# Patient Record
Sex: FEMALE | Race: WHITE
[De-identification: names, ages, dates, MRNs, and addresses within clinical notes are randomized per-mention and may not be internally consistent; named-entity substitution may affect disease eponyms.]

---

## 2017-01-01 ENCOUNTER — HOSPITAL ENCOUNTER (EMERGENCY)
Age: 27
Discharge: HOME | End: 2017-01-01
Payer: MEDICAID

## 2017-01-01 DIAGNOSIS — Z87.891: ICD-10-CM

## 2017-01-01 DIAGNOSIS — O99.513: Primary | ICD-10-CM

## 2017-01-01 DIAGNOSIS — Z3A.36: ICD-10-CM

## 2017-01-01 DIAGNOSIS — J06.9: ICD-10-CM

## 2017-01-01 DIAGNOSIS — O09.293: ICD-10-CM

## 2017-01-01 PROCEDURE — 99283 EMERGENCY DEPT VISIT LOW MDM: CPT

## 2017-01-01 PROCEDURE — 94640 AIRWAY INHALATION TREATMENT: CPT

## 2017-01-01 PROCEDURE — 99282 EMERGENCY DEPT VISIT SF MDM: CPT

## 2017-08-20 ENCOUNTER — HOSPITAL ENCOUNTER (EMERGENCY)
Dept: HOSPITAL 76 - ED | Age: 27
Discharge: HOME | End: 2017-08-20
Payer: COMMERCIAL

## 2017-08-20 VITALS — SYSTOLIC BLOOD PRESSURE: 112 MMHG | DIASTOLIC BLOOD PRESSURE: 66 MMHG

## 2017-08-20 DIAGNOSIS — K14.0: Primary | ICD-10-CM

## 2017-08-20 DIAGNOSIS — Z87.891: ICD-10-CM

## 2017-08-20 PROCEDURE — 99283 EMERGENCY DEPT VISIT LOW MDM: CPT

## 2017-08-20 PROCEDURE — 99282 EMERGENCY DEPT VISIT SF MDM: CPT

## 2017-08-20 NOTE — ED PHYSICIAN DOCUMENTATION
PD HPI HEENT





- Stated complaint


Stated Complaint: SWOLLEN TONGUE





- Chief complaint


Chief Complaint: Heent





- History obtained from


History obtained from: Patient





- History of Present Illness


Timing - onset: How many days ago (few days of tongue swelling under area of 

long-time piercing. Has not had new ring/stud, same one for 3 months, without 

prior reaction to metals. No prior infections.)


Timing - duration: Days


Timing - details: Gradual onset, Still present


Location: Mouth (tongue).  No: Right ear, Left ear





Review of Systems


Constitutional: denies: Fever, Chills


Skin: denies: Rash, Lesions





PD PAST MEDICAL HISTORY





- Past Medical History


GYN: Miscarriage(s)





- Past Surgical History


Past Surgical History: Yes


/GYN: Dilation and currettage





- Present Medications


Home Medications: 


 Ambulatory Orders











 Medication  Instructions  Recorded  Confirmed


 


Cetirizine [ZyrTEC] 10 mg PO DAILY 08/20/17 08/20/17


 


Chlorhexidine Gluconate [Peridex] 5 ml MM BID #118 ml 08/20/17 


 


Clindamycin [Cleocin] 150 mg PO QID #24 capsule 08/20/17 














- Allergies


Allergies/Adverse Reactions: 


 Allergies











Allergy/AdvReac Type Severity Reaction Status Date / Time


 


Penicillins Allergy Mild Rash Verified 08/20/17 15:16


 


Children's Motrin Allergy  Hives Uncoded 08/20/17 15:16














- Social History


Does the pt smoke?: No


Smoking Status: Former smoker


Does the pt drink ETOH?: Yes


Does the pt have substance abuse?: No





- Immunizations


Immunizations are current?: Yes





- POLST


Patient has POLST: No





PD ED PE NORMAL





- Vitals


Vital signs reviewed: Yes





- General


General: Alert and oriented X 3, No acute distress, Well developed/nourished





- HEENT


HEENT: Other (tongue with piercing hole without discharge. Stud is out right 

now. area of firmness and tenderness of tongue in a cm size or so. No 

fluctuance. No redness per se.)





- Neck


Neck: Supple, no meningeal sign, No adenopathy





Results





- Vitals


Vitals: 


 Oxygen











O2 Source                      Room air

















PD MEDICAL DECISION MAKING





- ED course


Complexity details: considered differential (firmness without fluctuance in 

area and there is the hole from the piercing, so no need for I&D. ), d/w patient





Departure





- Departure


Disposition: 01 Home, Self Care


Clinical Impression: 


 Infected pierced tongue


Condition: Stable


Record reviewed to determine appropriate education?: Yes


Prescriptions: 


Clindamycin [Cleocin] 150 mg PO QID #24 capsule


Chlorhexidine Gluconate [Peridex] 5 ml MM BID #118 ml


Comments: 


 I assume infection is the most likely cause for the swelling of the tongue 

even though it does not seem purulent.  The swelling of there feels like 

inflammation.  I would do some antimicrobial mouth rinse 2-3 times a day and 

you could even use a Q-tip at the piercing site with it.  Also clindamycin oral 

antibiotic as directed.  See if the swelling goes down over the next few days.


Discharge Date/Time: 08/20/17 16:07

## 2017-08-28 ENCOUNTER — HOSPITAL ENCOUNTER (EMERGENCY)
Dept: HOSPITAL 76 - ED | Age: 27
Discharge: HOME | End: 2017-08-28
Payer: COMMERCIAL

## 2017-08-28 VITALS — SYSTOLIC BLOOD PRESSURE: 112 MMHG | DIASTOLIC BLOOD PRESSURE: 70 MMHG

## 2017-08-28 DIAGNOSIS — Z87.891: ICD-10-CM

## 2017-08-28 DIAGNOSIS — R10.13: Primary | ICD-10-CM

## 2017-08-28 DIAGNOSIS — R10.12: ICD-10-CM

## 2017-08-28 LAB
ALBUMIN/GLOB SERPL: 1.5 {RATIO} (ref 1–2.2)
ANION GAP SERPL CALCULATED.4IONS-SCNC: 9 MMOL/L (ref 6–13)
BASOPHILS NFR BLD AUTO: 0.1 10^3/UL (ref 0–0.1)
BASOPHILS NFR BLD AUTO: 1 %
BILIRUB BLD-MCNC: 1 MG/DL (ref 0.2–1)
BUN SERPL-MCNC: 14 MG/DL (ref 6–20)
CALCIUM UR-MCNC: 9.1 MG/DL (ref 8.5–10.3)
CHLORIDE SERPL-SCNC: 105 MMOL/L (ref 101–111)
CO2 SERPL-SCNC: 24 MMOL/L (ref 21–32)
CREAT SERPLBLD-SCNC: 0.5 MG/DL (ref 0.4–1)
CUL URINE ADD CHARGE: (no result)
EOSINOPHIL # BLD AUTO: 0.3 10^3/UL (ref 0–0.7)
EOSINOPHIL NFR BLD AUTO: 3 %
ERYTHROCYTE [DISTWIDTH] IN BLOOD BY AUTOMATED COUNT: 13.7 % (ref 12–15)
GFRSERPLBLD MDRD-ARVRAT: 146 ML/MIN/{1.73_M2} (ref 89–?)
GLOBULIN SER-MCNC: 3 G/DL (ref 2.1–4.2)
GLUCOSE SERPL-MCNC: 118 MG/DL (ref 70–100)
HCG UR QL: NEGATIVE
HCT VFR BLD AUTO: 38 % (ref 37–47)
HGB UR QL STRIP: 12.8 G/DL (ref 12–16)
LIPASE SERPL-CCNC: 39 U/L (ref 22–51)
LYMPHOCYTES # SPEC AUTO: 3.1 10^3/UL (ref 1.5–3.5)
LYMPHOCYTES NFR BLD AUTO: 27.1 %
MCH RBC QN AUTO: 28 PG (ref 27–31)
MCHC RBC AUTO-ENTMCNC: 33.7 G/DL (ref 32–36)
MCV RBC AUTO: 83.1 FL (ref 81–99)
MONOCYTES # BLD AUTO: 0.7 10^3/UL (ref 0–1)
MONOCYTES NFR BLD AUTO: 6.2 %
NEUTROPHILS # BLD AUTO: 7.2 10^3/UL (ref 1.5–6.6)
NEUTROPHILS # SNV AUTO: 11.5 X10^3/UL (ref 4.8–10.8)
NEUTROPHILS NFR BLD AUTO: 62.7 %
NRBC # BLD AUTO: 0.1 /100WBC
PDW BLD AUTO: 7.4 FL (ref 7.9–10.8)
PH UR STRIP.AUTO: 6 PH (ref 5–7.5)
POTASSIUM SERPL-SCNC: 3.5 MMOL/L (ref 3.5–5)
PROT SPEC-MCNC: 7.6 G/DL (ref 6.7–8.2)
RBC MAR: 4.57 10^6/UL (ref 4.2–5.4)
SODIUM SERPLBLD-SCNC: 138 MMOL/L (ref 135–145)
SP GR UR STRIP.AUTO: >=1.03 (ref 1–1.03)
SP GR UR STRIP.AUTO: >=1.03 (ref 1–1.03)
UA CHARGE (STRIP ONLY): (no result)
UA W/ MICROSCOPIC CHARGE: YES
UR CULTURE IF IND: (no result)
UROBILINOGEN UR STRIP.AUTO-MCNC: NEGATIVE MG/DL
WBC # BLD: 11.5 X10^3/UL
WBC # UR MANUAL: (no result) /HPF (ref 0–5)

## 2017-08-28 PROCEDURE — 36415 COLL VENOUS BLD VENIPUNCTURE: CPT

## 2017-08-28 PROCEDURE — 80053 COMPREHEN METABOLIC PANEL: CPT

## 2017-08-28 PROCEDURE — 81025 URINE PREGNANCY TEST: CPT

## 2017-08-28 PROCEDURE — 81001 URINALYSIS AUTO W/SCOPE: CPT

## 2017-08-28 PROCEDURE — 87086 URINE CULTURE/COLONY COUNT: CPT

## 2017-08-28 PROCEDURE — 81003 URINALYSIS AUTO W/O SCOPE: CPT

## 2017-08-28 PROCEDURE — 85025 COMPLETE CBC W/AUTO DIFF WBC: CPT

## 2017-08-28 PROCEDURE — 99283 EMERGENCY DEPT VISIT LOW MDM: CPT

## 2017-08-28 PROCEDURE — 74160 CT ABDOMEN W/CONTRAST: CPT

## 2017-08-28 PROCEDURE — 83690 ASSAY OF LIPASE: CPT
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
## 2017-08-28 NOTE — CT REPORT
EXAM:

CT ABDOMEN

 

EXAM DATE: 8/28/2017 07:53 PM.

 

CLINICAL HISTORY: Upper abdominal pain following trauma.

 

COMPARISON: Limited abdominal ultrasound 08/11/2016. No prior CT study..

 

TECHNIQUE: Routine helical CT imaging was performed through the abdomen.  IV contrast: 100 mL isovue-
300 Enteric contrast: No. Reconstruction: Coronal and sagittal.

 

In accordance with CT protocol optimization, one or more of the following dose reduction techniques w
ere utilized for this exam: automated exposure control, adjustment of mA and/or KV based on patient s
ize, or use of iterative reconstructive technique.

 

FINDINGS: 

Lung Bases: Unremarkable.

 

Liver: Stable 1.8 cm lesion right lobe of liver consistent with hemangioma. Several 3 mm hepatic cyst
s.

 

Gallbladder/Bile Ducts: Unremarkable.

 

Spleen: Normal.

 

Pancreas: Normal.

 

Adrenal Glands: Normal.

 

Kidneys: 1.5 cm staghorn calculus inferior left calyceal system. Several 5 mm and smaller stones in b
oth kidneys. No hydronephrosis nor renal mass lesions. 

 

Peritoneal Cavity/Bowel: Normal. No free fluid, free air or adenopathy. No masses or acute inflammato
ry process. The appendix is well visualized and normal.

 

Vasculature: No aneurysms or other significant abnormality.

 

Bones: Old minimal anterior wedging T11.

Trabecular and cortical patterns are intact.

 

Other: None.

 

IMPRESSION: 

1. Stable 1.8 cm hemangioma right lobe of the liver. 

2. Bilateral nephrolithiasis. 

3. No acute posttraumatic abnormality.

 

[facility]

Referring Provider Line: [phone]

 

SITE ID: [id number]

## 2017-08-28 NOTE — CT PRELIMINARY REPORT
Accession: [id number]

Exam: CT Abdomen W/

 

IMPRESSION: 

1. Stable 1.8 cm hemangioma right lobe of the liver. 

2. Bilateral nephrolithiasis. 

3. No acute posttraumatic abnormality.

 

[facility]

 

SITE ID: [id number]

## 2017-09-26 ENCOUNTER — HOSPITAL ENCOUNTER (EMERGENCY)
Dept: HOSPITAL 76 - ED | Age: 27
Discharge: HOME | End: 2017-09-26
Payer: MEDICAID

## 2017-09-26 VITALS — SYSTOLIC BLOOD PRESSURE: 111 MMHG | DIASTOLIC BLOOD PRESSURE: 60 MMHG

## 2017-09-26 DIAGNOSIS — K52.9: Primary | ICD-10-CM

## 2017-09-26 DIAGNOSIS — Z87.891: ICD-10-CM

## 2017-09-26 LAB
ALBUMIN/GLOB SERPL: 1.2 {RATIO} (ref 1–2.2)
ANION GAP SERPL CALCULATED.4IONS-SCNC: 7 MMOL/L (ref 6–13)
BASOPHILS NFR BLD AUTO: 0 10^3/UL (ref 0–0.1)
BASOPHILS NFR BLD AUTO: 0.3 %
BILIRUB BLD-MCNC: 2.2 MG/DL (ref 0.2–1)
BUN SERPL-MCNC: 9 MG/DL (ref 6–20)
CALCIUM UR-MCNC: 8.4 MG/DL (ref 8.5–10.3)
CHLORIDE SERPL-SCNC: 107 MMOL/L (ref 101–111)
CO2 SERPL-SCNC: 22 MMOL/L (ref 21–32)
CREAT SERPLBLD-SCNC: 0.4 MG/DL (ref 0.4–1)
EOSINOPHIL # BLD AUTO: 0.3 10^3/UL (ref 0–0.7)
EOSINOPHIL NFR BLD AUTO: 3.3 %
ERYTHROCYTE [DISTWIDTH] IN BLOOD BY AUTOMATED COUNT: 14.1 % (ref 12–15)
GFRSERPLBLD MDRD-ARVRAT: 189 ML/MIN/{1.73_M2} (ref 89–?)
GLOBULIN SER-MCNC: 3.2 G/DL (ref 2.1–4.2)
GLUCOSE SERPL-MCNC: 94 MG/DL (ref 70–100)
HCT VFR BLD AUTO: 40 % (ref 37–47)
HGB UR QL STRIP: 13.4 G/DL (ref 12–16)
LIPASE SERPL-CCNC: 32 U/L (ref 22–51)
LYMPHOCYTES # SPEC AUTO: 1.5 10^3/UL (ref 1.5–3.5)
LYMPHOCYTES NFR BLD AUTO: 17.2 %
MCH RBC QN AUTO: 28 PG (ref 27–31)
MCHC RBC AUTO-ENTMCNC: 33.5 G/DL (ref 32–36)
MCV RBC AUTO: 83.6 FL (ref 81–99)
MONOCYTES # BLD AUTO: 0.6 10^3/UL (ref 0–1)
MONOCYTES NFR BLD AUTO: 6.7 %
NEUTROPHILS # BLD AUTO: 6.2 10^3/UL (ref 1.5–6.6)
NEUTROPHILS # SNV AUTO: 8.6 X10^3/UL (ref 4.8–10.8)
NEUTROPHILS NFR BLD AUTO: 72.5 %
NRBC # BLD AUTO: 0 /100WBC
PDW BLD AUTO: 7.2 FL (ref 7.9–10.8)
POTASSIUM SERPL-SCNC: 3.6 MMOL/L (ref 3.5–5)
PROT SPEC-MCNC: 7 G/DL (ref 6.7–8.2)
RBC MAR: 4.78 10^6/UL (ref 4.2–5.4)
SODIUM SERPLBLD-SCNC: 136 MMOL/L (ref 135–145)
WBC # BLD: 8.6 X10^3/UL

## 2017-09-26 PROCEDURE — 80053 COMPREHEN METABOLIC PANEL: CPT

## 2017-09-26 PROCEDURE — 83690 ASSAY OF LIPASE: CPT

## 2017-09-26 PROCEDURE — 99283 EMERGENCY DEPT VISIT LOW MDM: CPT

## 2017-09-26 PROCEDURE — 96374 THER/PROPH/DIAG INJ IV PUSH: CPT

## 2017-09-26 PROCEDURE — 36415 COLL VENOUS BLD VENIPUNCTURE: CPT

## 2017-09-26 PROCEDURE — 85025 COMPLETE CBC W/AUTO DIFF WBC: CPT

## 2020-12-18 ENCOUNTER — HOSPITAL ENCOUNTER (OUTPATIENT)
Dept: HOSPITAL 76 - COV | Age: 30
Discharge: HOME | End: 2020-12-18
Attending: FAMILY MEDICINE
Payer: COMMERCIAL

## 2020-12-18 DIAGNOSIS — Z20.828: Primary | ICD-10-CM

## 2022-11-25 ENCOUNTER — HOSPITAL ENCOUNTER (EMERGENCY)
Dept: HOSPITAL 76 - ED | Age: 32
Discharge: HOME | End: 2022-11-25
Payer: COMMERCIAL

## 2022-11-25 VITALS — DIASTOLIC BLOOD PRESSURE: 89 MMHG | SYSTOLIC BLOOD PRESSURE: 133 MMHG

## 2022-11-25 DIAGNOSIS — Z87.891: ICD-10-CM

## 2022-11-25 DIAGNOSIS — J10.1: Primary | ICD-10-CM

## 2022-11-25 LAB
B PARAPERT DNA SPEC QL NAA+PROBE: NOT DETECTED
B PERT DNA SPEC QL NAA+PROBE: NOT DETECTED
C PNEUM DNA NPH QL NAA+NON-PROBE: NOT DETECTED
FLUAV H3 RNA ISLT QL NAA+PROBE: DETECTED
HAEM INFLU B DNA SPEC QL NAA+PROBE: NOT DETECTED
HCOV 229E RNA SPEC QL NAA+PROBE: NOT DETECTED
HCOV HKU1 RNA UPPER RESP QL NAA+PROBE: NOT DETECTED
HCOV NL63 RNA ASPIRATE QL NAA+PROBE: NOT DETECTED
HCOV OC43 RNA SPEC QL NAA+PROBE: NOT DETECTED
HMPV AG SPEC QL: NOT DETECTED
HPIV1 RNA NPH QL NAA+PROBE: NOT DETECTED
HPIV2 SPEC QL CULT: NOT DETECTED
HPIV3 AB TITR SER CF: NOT DETECTED {TITER}
HPIV4 RNA SPEC QL NAA+PROBE: NOT DETECTED
M PNEUMO DNA SPEC QL NAA+PROBE: NOT DETECTED
RSV RNA RESP QL NAA+PROBE: NOT DETECTED
RV+EV RNA SPEC QL NAA+PROBE: NOT DETECTED
SARS-COV-2 RNA PNL SPEC NAA+PROBE: NOT DETECTED

## 2022-11-25 PROCEDURE — 94640 AIRWAY INHALATION TREATMENT: CPT

## 2022-11-25 PROCEDURE — 99284 EMERGENCY DEPT VISIT MOD MDM: CPT

## 2022-11-25 PROCEDURE — 87633 RESP VIRUS 12-25 TARGETS: CPT

## 2022-11-25 PROCEDURE — 94664 DEMO&/EVAL PT USE INHALER: CPT

## 2022-11-25 PROCEDURE — 99283 EMERGENCY DEPT VISIT LOW MDM: CPT

## 2022-11-25 PROCEDURE — 71046 X-RAY EXAM CHEST 2 VIEWS: CPT

## 2022-11-25 NOTE — EXTERNAL MEDICAL SUMMARY RPT
Continuity of Care Document

                          Created on:2022



Patient:Sylvia Myers

Sex:Female

:1990

External Reference #:[id number]





Demographics







                          Address                   [address] [place] [address]

 

                          Phone                     Unavailable

 

                          Preferred Language        English

 

                          Marital Status            Never 

 

                          Yazidi Affiliation     Unknown

 

                          Race                      Unknown

 

                          Ethnic Group              Unknown









Author







                          Organization              [facility]

 

                          [place] [address]

 

                          Phone                     [phone]









Care Team Providers







                    Name                Role                Phone

 

                    Radha Saabn       Unavailable         Unavailable









Allergies and Intolerances







                 date            description     facility        type

 

                 (no date)       [facility]  (unknown)







Encounters

No information.



Functional Status

No information.



Immunizations

No information.



Medications







                     date                description         facility

 

                     [id number]  [facility]







Problems

No information.



Procedures

No information.



Results/Labs







           test      date      author    facility  value     unit      interpret

ation









                                         Result panel 1









           (unknown)  (no       (unknown)  (unknown)  (no value)  (units    (unk

nown)



                    date)                                   unknown)  

 

           (unknown)  (no       (unknown)  (unknown)  [id number]  (units    (unkn

own)



                    date)                                   unknown)  

 

           (unknown)  (no       (unknown)  (unknown)  22  (units    (unkno

wn)



                    date)                                   unknown)  

 

           (unknown)  (no       (unknown)  (unknown)  Acute maxillary  (units   

 (unknown)



                    date)                         sinusitis unknown)  

 

           (unknown)  (no       (unknown)  (unknown)  Age/Sex: 32 / F  (units   

 (unknown)



                    date)                         Date of Service: unknown)  

 

           (unknown)  (no       (unknown)  (unknown)  Allergies  (units    (unkn

own)



                    date)                                   unknown)  

 

           (unknown)  (no       (unknown)  (unknown)  [facility]  (units  

  (unknown)



                    date)                         Medicine  unknown)  

 

           (unknown)  (no       (unknown)  (unknown)  [place]  (units    (

unknown)



                    date)                         [id number]     unknown)  

 

           (unknown)  (no       (unknown)  (unknown)  Anesthesia  (units    (unk

nown)



                    date)                                   unknown)  

 

           (unknown)  (no       (unknown)  (unknown)  Ankle pain  (units    (unk

nown)



                    date)                         ()   unknown)  

 

           (unknown)  (no       (unknown)  (unknown)  Attending Dr:  (units    (

unknown)



                    date)                         Laura Robin unknown)  



                                                  ARNP                

 

           (unknown)  (no       (unknown)  (unknown)  Breast cancer  (units    (

unknown)



                    date)                                   unknown)  

 

           (unknown)  (no       (unknown)  (unknown)  Brother Age: 32  (units   

 (unknown)



                    date)                         Hypertension unknown)  

 

           (unknown)  (no       (unknown)  (unknown)  Carpal tunnel  (units    (

unknown)



                    date)                         syndrome of right unknown)  



                                                  wrist               

 

           (unknown)  (no       (unknown)  (unknown)  : 1990  (units   

 (unknown)



                    date)                         Acct:[id number] unknown)  

 

           (unknown)  (no       (unknown)  (unknown)  Depression  (units    (unk

nown)



                    date)                         ()   unknown)  

 

           (unknown)  (no       (unknown)  (unknown)  Dept at   (units    (unkno

wn)



                    date)                         [phone] unknown)  

 

           (unknown)  (no       (unknown)  (unknown)  Documented By:  (units    

(unknown)



                    date)                         Laura Robin unknown)  



                                                  [facility] 22 0943           

 

           (unknown)  (no       (unknown)  (unknown)  Draft     (units    (unkno

wn)



                    date)                                   unknown)  

 

           (unknown)  (no       (unknown)  (unknown)  Family History  (units    

(unknown)



                    date)                         (Reviewed 22 unknown)  



                                                  @ 11:14 by Den Saab DO)           

 

           (unknown)  (no       (unknown)  (unknown)  Family Practice  (units   

 (unknown)



                    date)                         Office Visit unknown)  

 

           (unknown)  (no       (unknown)  (unknown)  Father Age: 64  (units    

(unknown)



                    date)                         Diabetes mellitus unknown)  

 

           (unknown)  (no       (unknown)  (unknown)  Foot pain (-)  (units 

   (unknown)



                    date)                                   unknown)  

 

           (unknown)  (no       (unknown)  (unknown)  Grandfather  (units    (un

known)



                    date)                         Bladder cancer unknown)  

 

           (unknown)  (no       (unknown)  (unknown)  Grandfather  (units    (un

known)



                    date)                          Diabetes unknown)  



                                                  mellitus            

 

           (unknown)  (no       (unknown)  (unknown)  Grandmother  (units    (un

known)



                    date)                          Glaucoma unknown)  

 

           (unknown)  (no       (unknown)  (unknown)  HIVES     (units    (unkno

wn)



                    date)                                   unknown)  

 

           (unknown)  (no       (unknown)  (unknown)  History of  (units    (unk

nown)



                    date)                         nephrolithiasis unknown)  

 

           (unknown)  (no       (unknown)  (unknown)  Hypertension  (units    (u

nknown)



                    date)                                   unknown)  

 

           (unknown)  (no       (unknown)  (unknown)  Intake Note:  (units    (u

nknown)



                    date)                                   unknown)  

 

           (unknown)  (no       (unknown)  (unknown)  Intake performed  (units  

  (unknown)



                    date)                         by: Christie Robertson unknown)  

 

           (unknown)  (no       (unknown)  (unknown)  Intake    (units    (unkno

wn)



                    date)                                   unknown)  

 

           (unknown)  (no       (unknown)  (unknown)  Intake- Clincial  (units  

  (unknown)



                    date)                         Staff     unknown)  

 

           (unknown)  (no       (unknown)  (unknown)  Last Menstural  (units    

(unknown)



                    date)                         Cycle + Details unknown)  

 

           (unknown)  (no       (unknown)  (unknown)  Loc: AFM  (units    (unkno

wn)



                    date)                                   unknown)  

 

           (unknown)  (no       (unknown)  (unknown)  Medical History  (units   

 (unknown)



                    date)                         (Updated 22 unknown)  



                                                  @ 11:15 by Den Saab DO)           

 

           (unknown)  (no       (unknown)  (unknown)  Medications  (units    (un

known)



                    date)                                   unknown)  

 

           (unknown)  (no       (unknown)  (unknown)  Other Menstrual  (units   

 (unknown)



                    date)                         Period: Other unknown)  

 

           (unknown)  (no       (unknown)  (unknown)  PFSH      (units    (unkno

wn)



                    date)                                   unknown)  

 

           (unknown)  (no       (unknown)  (unknown)  Patient presents  (units  

  (unknown)



                    date)                         to [facility] with unknown)  



                                                  concerns for chest           



                                                  tightness,           



                                                  productive cough x           



                                                  2                   

 

           (unknown)  (no       (unknown)  (unknown)  Patient:  (units    (unkno

wn)



                    date)                         Sylvia Myers CORDELIA unknown)  



                                                  MR#: [id number]              

 

           (unknown)  (no       (unknown)  (unknown)  Penicillins  (units    (un

known)



                    date)                         [PENICILLINS] unknown)  



                                                  Allergy (Mild,           



                                                  Verified 22           



                                                  09:47)              

 

           (unknown)  (no       (unknown)  (unknown)  Peroneal  (units    (unkno

wn)



                    date)                         tendonitis unknown)  

 

           (unknown)  (no       (unknown)  (unknown)  Reason For Visit  (units  

  (unknown)



                    date)                                   unknown)  

 

           (unknown)  (no       (unknown)  (unknown)  Signed By:  (units    (unk

nown)



                    date)                                   unknown)  

 

           (unknown)  (no       (unknown)  (unknown)  Smoking Status:  (units   

 (unknown)



                    date)                         Former smoker unknown)  

 

           (unknown)  (no       (unknown)  (unknown)  Status post  (units    (un

known)



                    date)                         dilation and unknown)  



                                                  curettage ()           

 

           (unknown)  (no       (unknown)  (unknown)  Status post tubal  (units 

   (unknown)



                    date)                         ligation  unknown)  



                                                  (17)           

 

           (unknown)  (no       (unknown)  (unknown)  Surgical History  (units  

  (unknown)



                    date)                         (Reviewed 22 unknown)  



                                                  @ 11:14 by Den Saab DO)           

 

           (unknown)  (no       (unknown)  (unknown)  TMJ pain  (units    (unkno

wn)



                    date)                         dysfunction unknown)  



                                                  syndrome            

 

           (unknown)  (no       (unknown)  (unknown)  This note may  (units    (

unknown)



                    date)                         have been all or unknown)  



                                                  partially           



                                                  generated using           



                                                  voice recognition           

 

           (unknown)  (no       (unknown)  (unknown)  Tobacco +  (units    (unkn

own)



                    date)                         Substance Use unknown)  

 

           (unknown)  (no       (unknown)  (unknown)  Tobacco Status  (units    

(unknown)



                    date)                                   unknown)  

 

           (unknown)  (no       (unknown)  (unknown)  Visit Reasons:  (units    

(unknown)



                    date)                         dry cough chest unknown)  



                                                  pain sob            

 

           (unknown)  (no       (unknown)  (unknown)  [Rx Confirmed  (units    (

unknown)



                    date)                         22] unknown)  

 

           (unknown)  (no       (unknown)  (unknown)  children's  (units    (unk

nown)



                    date)                         ibuprofen Allergy unknown)  



                                                  (Uncoded 22           



                                                  09:47)              

 

           (unknown)  (no       (unknown)  (unknown) days. Patient  (units    (u

nknown)



                    date)                         reports chest unknown)  



                                                  tightness,           



                                                  intermittent brown           



                                                  sputum with cough,           



                                                  and                 

 

           (unknown)  (no       (unknown)  (unknown)  have occurred. If  (units 

   (unknown)



                    date)                         there are any unknown)  



                                                  questions, please           



                                                  contact the           



                                                  Medical Records           

 

           (unknown)  (no       (unknown)  (unknown)  may occur.  (units    (unk

nown)



                    date)                         Occasional unknown)  



                                                  wrong-word or           



                                                  'sound-alike'           



                                                  substitutions may           



                                                  have                

 

           (unknown)  (no       (unknown)  (unknown)  occurred due to  (units   

 (unknown)



                    date)                         the inherent unknown)  



                                                  limitations of           



                                                  voice recognition           



                                                  software. Please           

 

           (unknown)  (no       (unknown)  (unknown)  pressure headache  (units 

   (unknown)



                    date)                         with cough. Denies unknown)  



                                                  fever.              

 

           (unknown)  (no       (unknown)  (unknown)  read the note  (units    (

unknown)



                    date)                         carefully and unknown)  



                                                  recognize, using           



                                                  context, where           



                                                  these               



                                                  substitutions           

 

           (unknown)  (no       (unknown)  (unknown)  sertraline 100 mg  (units 

   (unknown)



                    date)                         tablet See Rx unknown)  



                                                  Instructions           



                                                  .Route .COMPLEX           



                                                  #90 tabs 22           

 

           (unknown)  (no       (unknown)  (unknown)  software.  (units    (unkn

own)



                    date)                         Although every unknown)  



                                                  effort is made to           



                                                  edit content,           



                                                  transcription           



                                                  errors              









                                         Result panel 2









           (unknown)  (no       (unknown)  (unknown)  (no value)  (units    (unk

nown)



                    date)                                   unknown)  

 

           (unknown)  (no       (unknown)  (unknown)  [id number]  (units    (unkn

own)



                    date)                                   unknown)  

 

           (unknown)  (no       (unknown)  (unknown)  09:50     (units    (unkno

wn)



                    date)                                   unknown)  

 

           (unknown)  (no       (unknown)  (unknown)  22  (units    (unkno

wn)



                    date)                                   unknown)  

 

           (unknown)  (no       (unknown)  (unknown)  Acute maxillary  (units   

 (unknown)



                    date)                         sinusitis unknown)  

 

           (unknown)  (no       (unknown)  (unknown)  Age/Sex: 32 / F  (units   

 (unknown)



                    date)                         Date of Service: unknown)  

 

           (unknown)  (no       (unknown)  (unknown)  Allergies  (units    (unkn

own)



                    date)                                   unknown)  

 

           (unknown)  (no       (unknown)  (unknown)  [facility]  (units  

  (unknown)



                    date)                         Medicine  unknown)  

 

           (unknown)  (no       (unknown)  (unknown)  [place]  (units    (

unknown)



                    date)                         [id number]     unknown)  

 

           (unknown)  (no       (unknown)  (unknown)  Anesthesia  (units    (unk

nown)



                    date)                                   unknown)  

 

           (unknown)  (no       (unknown)  (unknown)  Ankle pain  (units    (unk

nown)



                    date)                         ()   unknown)  

 

           (unknown)  (no       (unknown)  (unknown)  Assessment + Plan  (units 

   (unknown)



                    date)                                   unknown)  

 

           (unknown)  (no       (unknown)  (unknown)  Attending Dr:  (units    (

unknown)



                    date)                         Laura Robin unknown)  



                                                  ARNP                

 

           (unknown)  (no       (unknown)  (unknown)  /85  (units    (unkn

own)



                    date)                                   unknown)  

 

           (unknown)  (no       (unknown)  (unknown)  Blood Pressure  (units    

(unknown)



                    date)                         Location Lt radial unknown)  

 

           (unknown)  (no       (unknown)  (unknown)  Breast cancer  (units    (

unknown)



                    date)                                   unknown)  

 

           (unknown)  (no       (unknown)  (unknown)  Brother Age: 32  (units   

 (unknown)



                    date)                         Hypertension unknown)  

 

           (unknown)  (no       (unknown)  (unknown)  Carpal tunnel  (units    (

unknown)



                    date)                         syndrome of right unknown)  



                                                  wrist               

 

           (unknown)  (no       (unknown)  (unknown)  Covid-19 + FLU  (units    

(unknown)



                    date)                         A/B + RSV - PCR unknown)  



                                                  Today R05.9 -           



                                                  Cough, unspecified           

 

           (unknown)  (no       (unknown)  (unknown)  : 1990  (units   

 (unknown)



                    date)                         Acct:[id number] unknown)  

 

           (unknown)  (no       (unknown)  (unknown)  Depression  (units    (unk

nown)



                    date)                         (-)   unknown)  

 

           (unknown)  (no       (unknown)  (unknown)  Dept at   (units    (unkno

wn)



                    date)                         [phone]. unknown)  

 

           (unknown)  (no       (unknown)  (unknown)  Documented By:  (units    

(unknown)



                    date)                         Laura Robin unknown)  



                                                  [facility] 22 0943           

 

           (unknown)  (no       (unknown)  (unknown)  Draft     (units    (unkno

wn)



                    date)                                   unknown)  

 

           (unknown)  (no       (unknown)  (unknown)  Family History  (units    

(unknown)



                    date)                         (Reviewed 22 unknown)  



                                                  @ 11:14 by Den Saab DO)           

 

           (unknown)  (no       (unknown)  (unknown)  Family Practice  (units   

 (unknown)



                    date)                         Office Visit unknown)  

 

           (unknown)  (no       (unknown)  (unknown)  Father Age: 64  (units    

(unknown)



                    date)                         Diabetes mellitus unknown)  

 

           (unknown)  (no       (unknown)  (unknown)  Foot pain (-)  (units 

   (unknown)



                    date)                                   unknown)  

 

           (unknown)  (no       (unknown)  (unknown)  Grandfather  (units    (un

known)



                    date)                         Bladder cancer unknown)  

 

           (unknown)  (no       (unknown)  (unknown)  Grandfather  (units    (un

known)



                    date)                          Diabetes unknown)  



                                                  mellitus            

 

           (unknown)  (no       (unknown)  (unknown)  Grandmother  (units    (un

known)



                    date)                          Glaucoma unknown)  

 

           (unknown)  (no       (unknown)  (unknown)  HIVES     (units    (unkno

wn)



                    date)                                   unknown)  

 

           (unknown)  (no       (unknown)  (unknown)  History of  (units    (unk

nown)



                    date)                         nephrolithiasis unknown)  

 

           (unknown)  (no       (unknown)  (unknown)  Hypertension  (units    (u

nknown)



                    date)                                   unknown)  

 

           (unknown)  (no       (unknown)  (unknown)  Intake Note:  (units    (u

nknown)



                    date)                                   unknown)  

 

           (unknown)  (no       (unknown)  (unknown)  Intake performed  (units  

  (unknown)



                    date)                         by: Christie Robertson unknown)  

 

           (unknown)  (no       (unknown)  (unknown)  Intake    (units    (unkno

wn)



                    date)                                   unknown)  

 

           (unknown)  (no       (unknown)  (unknown)  Intake- Clincial  (units  

  (unknown)



                    date)                         Staff     unknown)  

 

           (unknown)  (no       (unknown)  (unknown)  Last Menstural  (units    

(unknown)



                    date)                         Cycle + Details unknown)  

 

           (unknown)  (no       (unknown)  (unknown)  Loc: AFM  (units    (unkno

wn)



                    date)                                   unknown)  

 

           (unknown)  (no       (unknown)  (unknown)  Medical History  (units   

 (unknown)



                    date)                         (Updated 22 unknown)  



                                                  @ 11:15 by Den Saab DO)           

 

           (unknown)  (no       (unknown)  (unknown)  Medications  (units    (un

known)



                    date)                                   unknown)  

 

           (unknown)  (no       (unknown)  (unknown)  Orders    (units    (unkno

wn)



                    date)                                   unknown)  

 

           (unknown)  (no       (unknown)  (unknown)  Orders:   (units    (unkno

wn)



                    date)                                   unknown)  

 

           (unknown)  (no       (unknown)  (unknown)  Other Menstrual  (units   

 (unknown)



                    date)                         Period: Other unknown)  

 

           (unknown)  (no       (unknown)  (unknown)  Oxygen Delivery  (units   

 (unknown)



                    date)                         Method room air unknown)  

 

           (unknown)  (no       (unknown)  (unknown)  PFSH      (units    (unkno

wn)



                    date)                                   unknown)  

 

           (unknown)  (no       (unknown)  (unknown)  Patient presents  (units  

  (unknown)



                    date)                         to [facility] with unknown)  



                                                  concerns for chest           



                                                  tightness,           



                                                  productive cough x           



                                                  2                   

 

           (unknown)  (no       (unknown)  (unknown)  Patient:  (units    (unkno

wn)



                    date)                         Sylvia Myers L unknown)  



                                                  MR#: M              

 

           (unknown)  (no       (unknown)  (unknown)  Penicillins  (units    (un

known)



                    date)                         [PENICILLINS] unknown)  



                                                  Allergy (Mild,           



                                                  Verified 22           



                                                  09:47)              

 

           (unknown)  (no       (unknown)  (unknown)  Peroneal  (units    (unkno

wn)



                    date)                         tendonitis unknown)  

 

           (unknown)  (no       (unknown)  (unknown)  Position Sitting  (units  

  (unknown)



                    date)                                   unknown)  

 

           (unknown)  (no       (unknown)  (unknown)  Pulse 80  (units    (unkno

wn)



                    date)                                   unknown)  

 

           (unknown)  (no       (unknown)  (unknown)  Pulse Oximetry  (units    

(unknown)



                    date)                         (%) 99    unknown)  

 

           (unknown)  (no       (unknown)  (unknown)  Pulse Source  (units    (u

nknown)



                    date)                         Monitor   unknown)  

 

           (unknown)  (no       (unknown)  (unknown)  Reason For Visit  (units  

  (unknown)



                    date)                                   unknown)  

 

           (unknown)  (no       (unknown)  (unknown)  Respiration 16  (units    

(unknown)



                    date)                                   unknown)  

 

           (unknown)  (no       (unknown)  (unknown)  Signed By:  (units    (unk

nown)



                    date)                                   unknown)  

 

           (unknown)  (no       (unknown)  (unknown)  Smoking Status:  (units   

 (unknown)



                    date)                         Former smoker unknown)  

 

           (unknown)  (no       (unknown)  (unknown)  Status post  (units    (un

known)



                    date)                         dilation and unknown)  



                                                  curettage ()           

 

           (unknown)  (no       (unknown)  (unknown)  Status post tubal  (units 

   (unknown)



                    date)                         ligation  unknown)  



                                                  (17)           

 

           (unknown)  (no       (unknown)  (unknown)  Surgical History  (units  

  (unknown)



                    date)                         (Reviewed 22 unknown)  



                                                  @ 11:14 by Den Saab DO)           

 

           (unknown)  (no       (unknown)  (unknown)  TMJ pain  (units    (unkno

wn)



                    date)                         dysfunction unknown)  



                                                  syndrome            

 

           (unknown)  (no       (unknown)  (unknown)  Temp 96.9 F L  (units    (

unknown)



                    date)                                   unknown)  

 

           (unknown)  (no       (unknown)  (unknown)  Temp Source  (units    (un

known)



                    date)                         Temporal Artery unknown)  



                                                  Scan                

 

           (unknown)  (no       (unknown)  (unknown)  This note may  (units    (

unknown)



                    date)                         have been all or unknown)  



                                                  partially           



                                                  generated using           



                                                  voice recognition           

 

           (unknown)  (no       (unknown)  (unknown)  Tobacco +  (units    (unkn

own)



                    date)                         Substance Use unknown)  

 

           (unknown)  (no       (unknown)  (unknown)  Tobacco Status  (units    

(unknown)



                    date)                                   unknown)  

 

           (unknown)  (no       (unknown)  (unknown)  Visit Reasons:  (units    

(unknown)



                    date)                         dry cough chest unknown)  



                                                  pain sob            

 

           (unknown)  (no       (unknown)  (unknown)  Vitals    (units    (unkno

wn)



                    date)                                   unknown)  

 

           (unknown)  (no       (unknown)  (unknown)  [Rx Confirmed  (units    (

unknown)



                    date)                         22] unknown)  

 

           (unknown)  (no       (unknown)  (unknown)  children's  (units    (unk

nown)



                    date)                         ibuprofen Allergy unknown)  



                                                  (Uncoded 22           



                                                  09:47)              

 

           (unknown)  (no       (unknown)  (unknown) days. Patient  (units    (u

nknown)



                    date)                         reports chest unknown)  



                                                  tightness,           



                                                  intermittent brown           



                                                  sputum with cough,           



                                                  and                 

 

           (unknown)  (no       (unknown)  (unknown)  have occurred. If  (units 

   (unknown)



                    date)                         there are any unknown)  



                                                  questions, please           



                                                  contact the           



                                                  Medical Records           

 

           (unknown)  (no       (unknown)  (unknown)  may occur.  (units    (unk

nown)



                    date)                         Occasional unknown)  



                                                  wrong-word or           



                                                  'sound-alike'           



                                                  substitutions may           



                                                  have                

 

           (unknown)  (no       (unknown)  (unknown)  occurred due to  (units   

 (unknown)



                    date)                         the inherent unknown)  



                                                  limitations of           



                                                  voice recognition           



                                                  software. Please           

 

           (unknown)  (no       (unknown)  (unknown)  pressure headache  (units 

   (unknown)



                    date)                         with cough. Denies unknown)  



                                                  fever.              

 

           (unknown)  (no       (unknown)  (unknown)  read the note  (units    (

unknown)



                    date)                         carefully and unknown)  



                                                  recognize, using           



                                                  context, where           



                                                  these               



                                                  substitutions           

 

           (unknown)  (no       (unknown)  (unknown)  sertraline 100 mg  (units 

   (unknown)



                    date)                         tablet See Rx unknown)  



                                                  Instructions           



                                                  .Route .COMPLEX           



                                                  #90 tabs 22           

 

           (unknown)  (no       (unknown)  (unknown)  software.  (units    (unkn

own)



                    date)                         Although every unknown)  



                                                  effort is made to           



                                                  edit content,           



                                                  transcription           



                                                  errors              









                                         Result panel 3









           (unknown)  (no       (unknown)  (unknown)  (no value)  (units    (unk

nown)



                    date)                                   unknown)  

 

           (unknown)  (no       (unknown)  (unknown)  (1) Viral  (units    (unkn

own)



                    date)                         illness:  unknown)  

 

           (unknown)  (no       (unknown)  (unknown)  [id number]  (units    (unkn

own)



                    date)                                   unknown)  

 

           (unknown)  (no       (unknown)  (unknown)  09:50     (units    (unkno

wn)



                    date)                                   unknown)  

 

           (unknown)  (no       (unknown)  (unknown)  22 1006  (units    (

unknown)



                    date)                                   unknown)  

 

           (unknown)  (no       (unknown)  (unknown)  22  (units    (unkno

wn)



                    date)                                   unknown)  

 

           (unknown)  (no       (unknown)  (unknown)  Acute maxillary  (units   

 (unknown)



                    date)                         sinusitis unknown)  

 

           (unknown)  (no       (unknown)  (unknown)  Age/Sex: 32 / F  (units   

 (unknown)



                    date)                         Date of Service: unknown)  

 

           (unknown)  (no       (unknown)  (unknown)  Allergies  (units    (unkn

own)



                    date)                                   unknown)  

 

           (unknown)  (no       (unknown)  (unknown)  [facility]  (units  

  (unknown)



                    date)                         Medicine  unknown)  

 

           (unknown)  (no       (unknown)  (unknown)  [place]  (units    (

unknown)



                    date)                         [id number]     unknown)  

 

           (unknown)  (no       (unknown)  (unknown)  Anesthesia  (units    (unk

nown)



                    date)                                   unknown)  

 

           (unknown)  (no       (unknown)  (unknown)  Ankle pain  (units    (unk

nown)



                    date)                         (-)   unknown)  

 

           (unknown)  (no       (unknown)  (unknown)  Assessment + Plan  (units 

   (unknown)



                    date)                                   unknown)  

 

           (unknown)  (no       (unknown)  (unknown)  Attending Dr:  (units    (

unknown)



                    date)                         Laura Robin unknown)  



                                                  ARNP                

 

           (unknown)  (no       (unknown)  (unknown)  Auscultation:  (units    (

unknown)



                    date)                         clear to  unknown)  



                                                  auscultation           



                                                  bilaterally           

 

           (unknown)  (no       (unknown)  (unknown)  BMI 36.6  (units    (unkno

wn)



                    date)                                   unknown)  

 

           (unknown)  (no       (unknown)  (unknown)  /85  (units    (unkn

own)



                    date)                                   unknown)  

 

           (unknown)  (no       (unknown)  (unknown)  Blood Pressure  (units    

(unknown)



                    date)                         Location Lt radial unknown)  

 

           (unknown)  (no       (unknown)  (unknown)  Breast cancer  (units    (

unknown)



                    date)                                   unknown)  

 

           (unknown)  (no       (unknown)  (unknown)  Brother Age: 32  (units   

 (unknown)



                    date)                         Hypertension unknown)  

 

           (unknown)  (no       (unknown)  (unknown)  Carpal tunnel  (units    (

unknown)



                    date)                         syndrome of right unknown)  



                                                  wrist               

 

           (unknown)  (no       (unknown)  (unknown)  Chief Complaint  (units   

 (unknown)



                    date)                                   unknown)  

 

           (unknown)  (no       (unknown)  (unknown)  Chief Complaint:  (units  

  (unknown)



                    date)                         Cough     unknown)  

 

           (unknown)  (no       (unknown)  (unknown)  Confirmed  (units    (unkn

own)



                    date)                         22] unknown)  

 

           (unknown)  (no       (unknown)  (unknown)  Const     (units    (unkno

wn)



                    date)                                   unknown)  

 

           (unknown)  (no       (unknown)  (unknown)  Covid-19 + FLU  (units    

(unknown)



                    date)                         A/B + RSV - PCR unknown)  



                                                  Today R05.9 -           



                                                  Cough, unspecified           

 

           (unknown)  (no       (unknown)  (unknown)  : 1990  (units   

 (unknown)



                    date)                         Acct:[id number] unknown)  

 

           (unknown)  (no       (unknown)  (unknown)  Depression  (units    (unk

nown)



                    date)                         (-)   unknown)  

 

           (unknown)  (no       (unknown)  (unknown)  Dept at   (units    (unkno

wn)



                    date)                         [phone]. unknown)  

 

           (unknown)  (no       (unknown)  (unknown)  Details:  (units    (unkno

wn)



                    date)                                   unknown)  

 

           (unknown)  (no       (unknown)  (unknown)  Documented By:  (units    

(unknown)



                    date)                         Laura Robin unknown)  



                                                  [facility] 22 0943           

 

           (unknown)  (no       (unknown)  (unknown)  Ears: hearing  (units    (

unknown)



                    date)                         grossly normal unknown)  



                                                  bilaterally,           



                                                  external ears           



                                                  normal and TM's           



                                                  normal              

 

           (unknown)  (no       (unknown)  (unknown)  Effort +  (units    (unkno

wn)



                    date)                         Inspection: normal unknown)  



                                                  respiratory effort           

 

           (unknown)  (no       (unknown)  (unknown)  Exam Narrative  (units    

(unknown)



                    date)                                   unknown)  

 

           (unknown)  (no       (unknown)  (unknown)  Exam Narrative:  (units   

 (unknown)



                    date)                                   unknown)  

 

           (unknown)  (no       (unknown)  (unknown)  Exam within  (units    (un

known)



                    date)                         normal limits, HPI unknown)  



                                                  suggest viral           



                                                  symptoms.           



                                                  Supportive           



                                                  treatment           

 

           (unknown)  (no       (unknown)  (unknown)  Exam      (units    (unkno

wn)



                    date)                                   unknown)  

 

           (unknown)  (no       (unknown)  (unknown)  Eyes      (units    (unkno

wn)



                    date)                                   unknown)  

 

           (unknown)  (no       (unknown)  (unknown)  Family History  (units    

(unknown)



                    date)                         (Reviewed 22 unknown)  



                                                  @ 11:14 by Den Saab DO)           

 

           (unknown)  (no       (unknown)  (unknown)  Family Practice  (units   

 (unknown)



                    date)                         Office Visit unknown)  

 

           (unknown)  (no       (unknown)  (unknown)  Father Age: 64  (units    

(unknown)



                    date)                         Diabetes mellitus unknown)  

 

           (unknown)  (no       (unknown)  (unknown)  Foot pain (-2013)  (units 

   (unknown)



                    date)                                   unknown)  

 

           (unknown)  (no       (unknown)  (unknown)  General:  (units    (unkno

wn)



                    date)                         appearance normal, unknown)  



                                                  both eyes and all           



                                                  related structures           

 

           (unknown)  (no       (unknown)  (unknown)  General:  (units    (unkno

wn)



                    date)                         cooperative, unknown)  



                                                  healthy appearing,           



                                                  comfortable and no           



                                                  acute distress           

 

           (unknown)  (no       (unknown)  (unknown)  General: no  (units    (un

known)



                    date)                         rashes or lesions unknown)  



                                                  noted               

 

           (unknown)  (no       (unknown)  (unknown)  Grandfather  (units    (un

known)



                    date)                         Bladder cancer unknown)  

 

           (unknown)  (no       (unknown)  (unknown)  Grandfather  (units    (un

known)



                    date)                          Diabetes unknown)  



                                                  mellitus            

 

           (unknown)  (no       (unknown)  (unknown)  Grandmother  (units    (un

known)



                    date)                          Glaucoma unknown)  

 

           (unknown)  (no       (unknown)  (unknown)  HENMT     (units    (unkno

wn)



                    date)                                   unknown)  

 

           (unknown)  (no       (unknown)  (unknown)  HIVES     (units    (unkno

wn)



                    date)                                   unknown)  

 

           (unknown)  (no       (unknown)  (unknown)  HPI       (units    (unkno

wn)



                    date)                                   unknown)  

 

           (unknown)  (no       (unknown)  (unknown)  Head: normal to  (units   

 (unknown)



                    date)                         inspection unknown)  

 

           (unknown)  (no       (unknown)  (unknown)  Height 5 ft 2 in  (units  

  (unknown)



                    date)                                   unknown)  

 

           (unknown)  (no       (unknown)  (unknown)  History of  (units    (unk

nown)



                    date)                         nephrolithiasis unknown)  

 

           (unknown)  (no       (unknown)  (unknown)  Hypertension  (units    (u

nknown)



                    date)                                   unknown)  

 

           (unknown)  (no       (unknown)  (unknown)  Intake Note:  (units    (u

nknown)



                    date)                                   unknown)  

 

           (unknown)  (no       (unknown)  (unknown)  Intake performed  (units  

  (unknown)



                    date)                         by: Christie Robertson unknown)  

 

           (unknown)  (no       (unknown)  (unknown)  Intake    (units    (unkno

wn)



                    date)                                   unknown)  

 

           (unknown)  (no       (unknown)  (unknown)  Intake- Clincial  (units  

  (unknown)



                    date)                         Staff     unknown)  

 

           (unknown)  (no       (unknown)  (unknown)  Last Menstural  (units    

(unknown)



                    date)                         Cycle + Details unknown)  

 

           (unknown)  (no       (unknown)  (unknown)  Loc: AFM  (units    (unkno

wn)



                    date)                                   unknown)  

 

           (unknown)  (no       (unknown)  (unknown)  Medical History  (units   

 (unknown)



                    date)                         (Updated 22 unknown)  



                                                  @ 11:15 by Den Saab DO)           

 

           (unknown)  (no       (unknown)  (unknown)  Medications  (units    (un

known)



                    date)                                   unknown)  

 

           (unknown)  (no       (unknown)  (unknown)  Medications:  (units    (u

nknown)



                    date)                                   unknown)  

 

           (unknown)  (no       (unknown)  (unknown)  Mouth: oral  (units    (un

known)



                    date)                         mucosae normal unknown)  

 

           (unknown)  (no       (unknown)  (unknown)  Neck      (units    (unkno

wn)



                    date)                                   unknown)  

 

           (unknown)  (no       (unknown)  (unknown)  Neck: normal  (units    (u

nknown)



                    date)                         visual inspection, unknown)  



                                                  full ROM and no           



                                                  lymphadenopathy           

 

           (unknown)  (no       (unknown)  (unknown)  New       (units    (unkno

wn)



                    date)                                   unknown)  

 

           (unknown)  (no       (unknown)  (unknown)  Nose: external  (units    

(unknown)



                    date)                         nose normal unknown)  

 

           (unknown)  (no       (unknown)  (unknown)  Orders    (units    (unkno

wn)



                    date)                                   unknown)  

 

           (unknown)  (no       (unknown)  (unknown)  Orders:   (units    (unkno

wn)



                    date)                                   unknown)  

 

           (unknown)  (no       (unknown)  (unknown)  Other Menstrual  (units   

 (unknown)



                    date)                         Period: Other unknown)  

 

           (unknown)  (no       (unknown)  (unknown)  Oxygen Delivery  (units   

 (unknown)



                    date)                         Method room air unknown)  

 

           (unknown)  (no       (unknown)  (unknown)  PFSH      (units    (unkno

wn)



                    date)                                   unknown)  

 

           (unknown)  (no       (unknown)  (unknown)  Patient presents  (units  

  (unknown)



                    date)                         to [facility] with unknown)  



                                                  concerns for chest           



                                                  tightness,           



                                                  productive cough x           



                                                  2                   

 

           (unknown)  (no       (unknown)  (unknown)  Patient presents  (units  

  (unknown)



                    date)                         with  to unknown)  



                                                  walk-in clinic           



                                                  with complaints of           



                                                  cough,              

 

           (unknown)  (no       (unknown)  (unknown)  Patient states  (units    

(unknown)



                    date)                         that she has young unknown)  



                                                  kids at home who           



                                                  had viral           



                                                  illnesses the week           

 

           (unknown)  (no       (unknown)  (unknown)  Patient:  (units    (unkno

wn)



                    date)                         Louis,Sylvia L unknown)  



                                                  MR#: M              

 

           (unknown)  (no       (unknown)  (unknown)  Penicillins  (units    (un

known)



                    date)                         [PENICILLINS] unknown)  



                                                  Allergy (Mild,           



                                                  Verified 22           



                                                  09:47)              

 

           (unknown)  (no       (unknown)  (unknown)  Peroneal  (units    (unkno

wn)



                    date)                         tendonitis unknown)  

 

           (unknown)  (no       (unknown)  (unknown)  Plan      (units    (unkno

wn)



                    date)                                   unknown)  

 

           (unknown)  (no       (unknown)  (unknown)  Position Sitting  (units  

  (unknown)



                    date)                                   unknown)  

 

           (unknown)  (no       (unknown)  (unknown)  Pulse 80  (units    (unkno

wn)



                    date)                                   unknown)  

 

           (unknown)  (no       (unknown)  (unknown)  Pulse Oximetry  (units    

(unknown)



                    date)                         (%) 99    unknown)  

 

           (unknown)  (no       (unknown)  (unknown)  Pulse Source  (units    (u

nknown)



                    date)                         Monitor   unknown)  

 

           (unknown)  (no       (unknown)  (unknown)  Reason For Visit  (units  

  (unknown)



                    date)                                   unknown)  

 

           (unknown)  (no       (unknown)  (unknown)  Resp      (units    (unkno

wn)



                    date)                                   unknown)  

 

           (unknown)  (no       (unknown)  (unknown)  Respiration 16  (units    

(unknown)



                    date)                                   unknown)  

 

           (unknown)  (no       (unknown)  (unknown)  Signed By:  (units    (unk

nown)



                    date)                         <Electronically unknown)  



                                                  signed by Laura Robin>           

 

           (unknown)  (no       (unknown)  (unknown)  Signed    (units    (unkno

wn)



                    date)                                   unknown)  

 

           (unknown)  (no       (unknown)  (unknown)  Skin      (units    (unkno

wn)



                    date)                                   unknown)  

 

           (unknown)  (no       (unknown)  (unknown)  Smoking Status:  (units   

 (unknown)



                    date)                         Former smoker unknown)  

 

           (unknown)  (no       (unknown)  (unknown)  Status post  (units    (un

known)



                    date)                         dilation and unknown)  



                                                  curettage (-)           

 

           (unknown)  (no       (unknown)  (unknown)  Status post tubal  (units 

   (unknown)



                    date)                         ligation  unknown)  



                                                  (17)           

 

           (unknown)  (no       (unknown)  (unknown)  Surgical History  (units  

  (unknown)



                    date)                         (Reviewed 22 unknown)  



                                                  @ 11:14 by Den Saab DO)           

 

           (unknown)  (no       (unknown)  (unknown)  TMJ pain  (units    (unkno

wn)



                    date)                         dysfunction unknown)  



                                                  syndrome            

 

           (unknown)  (no       (unknown)  (unknown)  Temp 96.9 F L  (units    (

unknown)



                    date)                                   unknown)  

 

           (unknown)  (no       (unknown)  (unknown)  Temp Source  (units    (un

known)



                    date)                         Temporal Artery unknown)  



                                                  Scan                

 

           (unknown)  (no       (unknown)  (unknown)  This note may  (units    (

unknown)



                    date)                         have been all or unknown)  



                                                  partially           



                                                  generated using           



                                                  voice recognition           

 

           (unknown)  (no       (unknown)  (unknown)  Throat: posterior  (units 

   (unknown)



                    date)                         oropharynx normal unknown)  

 

           (unknown)  (no       (unknown)  (unknown)  Tobacco +  (units    (unkn

own)



                    date)                         Substance Use unknown)  

 

           (unknown)  (no       (unknown)  (unknown)  Tobacco Status  (units    

(unknown)



                    date)                                   unknown)  

 

           (unknown)  (no       (unknown)  (unknown)  Visit Reasons:  (units    

(unknown)



                    date)                         dry cough chest unknown)  



                                                  pain sob            

 

           (unknown)  (no       (unknown)  (unknown)  Vitals    (units    (unkno

wn)



                    date)                                   unknown)  

 

           (unknown)  (no       (unknown)  (unknown)  Weight 200 lb 8  (units   

 (unknown)



                    date)                         oz        unknown)  

 

           (unknown)  (no       (unknown)  (unknown)  [Rx Confirmed  (units    (

unknown)



                    date)                         22] unknown)  

 

           (unknown)  (no       (unknown)  (unknown)  benzonatate 100  (units   

 (unknown)



                    date)                         mg PO BID PRN 20 unknown)  



                                                  caps 0RF cough           

 

           (unknown)  (no       (unknown)  (unknown)  benzonatate 100  (units   

 (unknown)



                    date)                         mg capsule 100 mg unknown)  



                                                  PO BID PRN cough           



                                                  #20 caps 22           



                                                  [Rx                 

 

           (unknown)  (no       (unknown)  (unknown)  bilaterally  (units    (un

known)



                    date)                                   unknown)  

 

           (unknown)  (no       (unknown)  (unknown)  children's  (units    (unk

nown)



                    date)                         ibuprofen Allergy unknown)  



                                                  (Uncoded 22           



                                                  09:47)              

 

           (unknown)  (no       (unknown)  (unknown) days. Patient  (units    (u

nknown)



                    date)                         reports chest unknown)  



                                                  tightness,           



                                                  intermittent brown           



                                                  sputum with cough,           



                                                  and                 

 

           (unknown)  (no       (unknown)  (unknown)  feels wheezy  (units    (u

nknown)



                    date)                         after coughing unknown)  



                                                  episodes. She also           



                                                  reports some brown           



                                                  sputum.             

 

           (unknown)  (no       (unknown)  (unknown)  have occurred. If  (units 

   (unknown)



                    date)                         there are any unknown)  



                                                  questions, please           



                                                  contact the           



                                                  Medical Records           

 

           (unknown)  (no       (unknown)  (unknown)  help and patient  (units  

  (unknown)



                    date)                         is able to sleep unknown)  



                                                  throughout the           



                                                  night. Patient           



                                                  denies smoking           

 

           (unknown)  (no       (unknown)  (unknown)  including  (units    (unkn

own)



                    date)                         over-the-counter unknown)  



                                                  medications such           



                                                  as Delsym,           



                                                  Tylenol/ibuprofen.           



                                                  Rest,               

 

           (unknown)  (no       (unknown)  (unknown)  increase fluids.  (units  

  (unknown)



                    date)                         Return to the unknown)  



                                                  emergency           



                                                  department if           



                                                  symptoms worsen,           



                                                  new                 

 

           (unknown)  (no       (unknown)  (unknown)  may occur.  (units    (unk

nown)



                    date)                         Occasional unknown)  



                                                  wrong-word or           



                                                  'sound-alike'           



                                                  substitutions may           



                                                  have                

 

           (unknown)  (no       (unknown)  (unknown)  occasional  (units    (unk

nown)



                    date)                         wheezing and chest unknown)  



                                                  tightness for 2           



                                                  days. Patient           



                                                  states cough is           

 

           (unknown)  (no       (unknown)  (unknown)  occurred due to  (units   

 (unknown)



                    date)                         the inherent unknown)  



                                                  limitations of           



                                                  voice recognition           



                                                  software. Please           

 

           (unknown)  (no       (unknown)  (unknown)  or any history of  (units 

   (unknown)



                    date)                         respiratory issues unknown)  



                                                  including asthma           



                                                  or COPD. She           



                                                  reports she           

 

           (unknown)  (no       (unknown)  (unknown)  oriented and  (units    (u

nknown)



                    date)                         comfortable and unknown)  



                                                  able to talk in           



                                                  full sentences           



                                                  without distress.           

 

           (unknown)  (no       (unknown)  (unknown)  pressure headache  (units 

   (unknown)



                    date)                         with cough. Denies unknown)  



                                                  fever.              

 

           (unknown)  (no       (unknown)  (unknown)  prior. Patient  (units    

(unknown)



                    date)                         denies fever, body unknown)  



                                                  aches, chest pain.           



                                                  Patient is alert,           

 

           (unknown)  (no       (unknown)  (unknown)  read the note  (units    (

unknown)



                    date)                         carefully and unknown)  



                                                  recognize, using           



                                                  context, where           



                                                  these               



                                                  substitutions           

 

           (unknown)  (no       (unknown)  (unknown)  sertraline 100 mg  (units 

   (unknown)



                    date)                         tablet See Rx unknown)  



                                                  Instructions           



                                                  .Route .COMPLEX           



                                                  #90 tabs 22           

 

           (unknown)  (no       (unknown)  (unknown)  software.  (units    (unkn

own)



                    date)                         Although every unknown)  



                                                  effort is made to           



                                                  edit content,           



                                                  transcription           



                                                  errors              

 

           (unknown)  (no       (unknown)  (unknown)  symptoms develop.  (units 

   (unknown)



                    date)                         Follow-up with unknown)  



                                                  primary care as           



                                                  needed.             

 

           (unknown)  (no       (unknown)  (unknown)  worse in the  (units    (u

nknown)



                    date)                         morning, with unknown)  



                                                  over-the-counter           



                                                  cough medication           



                                                  it does seem to           







Social History







                     date                description         facility

 

                     +0000  Ex-smoker (finding)  [facility]







Vital Signs







                 date            measurement     value           units









              +0000  BMI          BMI          36.6         kg/m2

 

              +0000  BP_diastolic  BP_diastolic  85           mmHg

 

              +0000  BP_systolic  BP_systolic  118          mmHg

 

              [id number]+0000  heart_rate   heart_rate   80           /min

 

              +0000  height_metric  height_metric  157.48       cm

 

              +0000  height_standard  height_standard  62         

  in

 

              +0000  o2_saturation  o2_saturation  99           %

 

              +0000  respiration_rate  respiration_rate  16       

    /min

 

              +0000  temperature_metric  temperature_metric  36.06

        C

 

              +0000  temperature_standard  temperature_standard  9

6.9         F

 

              +0000  weight_metric  weight_metric  90.94        kg

 

              +0000  weight_standard  weight_standard  200.49     

  lb

## 2022-11-25 NOTE — ED PHYSICIAN DOCUMENTATION
PD HPI URI





- Stated complaint


Stated Complaint: COUGH





- Chief complaint


Chief Complaint: Resp





- History obtained from


History obtained from: Patient





- History of Present Illness


Timing - onset: How many weeks ago (several)


Timing details: Gradual onset


Pain level max: 0


Pain level now: 0


Associated symptoms: Nasal congestion, Rhinorrhea, Dry cough, Dyspnea.  No: 

Fever, Chills


Recently seen: Not recently seen





- Additional information


Additional information: 





Patient is a 32-year-old female who states that she has had a cough for the past

several weeks.  Children have been sick with same.  She states that recently she

has had increased coughing and wheezing.  She has used inhalers in the past as a

child.  Does not use any inhalers now.  No nausea or vomiting.  Denies any 

possibility of pregnancy.  Is not breast-feeding.





Review of Systems


Constitutional: denies: Fever


Nose: reports: Rhinorrhea / runny nose, Congestion


GI: denies: Vomiting, Diarrhea


Skin: denies: Rash


Musculoskeletal: denies: Neck pain, Back pain


Neurologic: denies: Headache





PD PAST MEDICAL HISTORY





- Past Medical History


Past Medical History: Yes


GYN: Miscarriage(s)





- Past Surgical History


Past Surgical History: Yes


/GYN: Dilation and currettage





- Present Medications


Home Medications: 


                                Ambulatory Orders











 Medication  Instructions  Recorded  Confirmed


 


Fexofenadine HCl [Allegra Allergy] 60 mg PO DAILY 09/26/17 09/26/17


 


Ondansetron Odt [Zofran] 4 mg TL Q6H PRN #10 tablet 09/26/17 


 


Albuterol Sulf [Ventolin Hfa 1 - 2 puffs INH Q4HR PRN #1 each 11/25/22 





Inhaler]   


 


Benzonatate [Tessalon] 200 mg PO TID PRN #30 cap 11/25/22 














- Allergies


Allergies/Adverse Reactions: 


                                    Allergies











Allergy/AdvReac Type Severity Reaction Status Date / Time


 


Penicillins Allergy Mild Rash Verified 09/26/17 13:08


 


Children's Motrin Allergy  Hives Uncoded 08/28/17 18:18














- Social History


Does the pt smoke?: No


Smoking Status: Former smoker


Does the pt drink ETOH?: Yes


Does the pt have substance abuse?: No





- Immunizations


Immunizations are current?: Yes





- POLST


Patient has POLST: No





PD ED PE NORMAL





- Vitals


Vital signs reviewed: Yes





- General


General: Alert and oriented X 3, No acute distress, Well developed/nourished





- HEENT


HEENT: PERRL, Moist mucous membranes





- Neck


Neck: Supple, no meningeal sign





- Cardiac


Cardiac: RRR





- Respiratory


Respiratory: No respiratory distress, Other (Mild wheezing bilaterally)





- Abdomen


Abdomen: Soft, Non tender, Non distended





- Derm


Derm: Warm and dry





- Extremities


Extremities: No edema, No calf tenderness / cord





- Neuro


Neuro: Alert and oriented X 3





- Psych


Psych: Normal mood, Normal affect





Results





- Vitals


Vitals: 


                               Vital Signs - 24 hr











  11/25/22 11/25/22 11/25/22





  14:14 16:07 16:21


 


Temperature 36.8 C  36.9 C


 


Heart Rate 78 78 94


 


Respiratory 16 20 18





Rate   


 


Blood Pressure 132/93 H  133/89 H


 


O2 Saturation 98  97








                                     Oxygen











O2 Source                      Room air

















- Labs


Labs: 


                                Laboratory Tests











  11/25/22





  14:16


 


Nasal Adenovirus (PCR)  NOT DETECTED


 


Nasal B. parapertussis DNA (PCR)  NOT DETECTED


 


Nasal Coronavir 229E PCR  NOT DETECTED


 


Nasal Coronavir HKU1 PCR  NOT DETECTED


 


Nasal Coronavir NL63 PCR  NOT DETECTED


 


Nasal Coronavir OC43 PCR  NOT DETECTED


 


Nasal Enterovir/Rhinovir PCR  NOT DETECTED


 


Nasal Influenza A H3 PCR  DETECTED A


 


Nasal Influenza B PCR  NOT DETECTED


 


Nasal Parainfluen 1 PCR  NOT DETECTED


 


Nasal Parainfluen 2 PCR  NOT DETECTED


 


Nasal Parainfluen 3 PCR  NOT DETECTED


 


Nasal Parainfluen 4 PCR  NOT DETECTED


 


Nasal RSV (PCR)  NOT DETECTED


 


Nasal B.pertussis DNA PCR  NOT DETECTED


 


Nasal C.pneumoniae (PCR)  NOT DETECTED


 


Juan Human Metapneumo PCR  NOT DETECTED


 


Nasal M.pneumoniae (PCR)  NOT DETECTED


 


Nasal SARS-CoV-2 (PCR)  NOT DETECTED














- Rads (name of study)


  ** Chest x-ray


Radiology: Final report received, EMP read contemporaneously, See rad report (No

 acute abnormality)





PD MEDICAL DECISION MAKING





- ED course


Complexity details: reviewed results, re-evaluated patient, considered 

differential, d/w patient


ED course: 





Patient feels better after breathing treatment.  Patient is well-appearing, 

nontoxic.  Positive for influenza A.  We will prescribe an inhaler for home.  I 

will prescribe cough medication as well.  No indication for antibiotics.  No 

respiratory distress.  No hypoxia.  Patient counseled regarding signs and 

symptoms for which I believe and urgent re-evaluation would be necessary. 

Patient with good understanding of and agreement to plan and is comfortable 

going home at this time





This document was made in part using voice recognition software. While efforts 

are made to proofread this document, sound alike and grammatical errors may o

ccur.





Departure





- Departure


Disposition: 01 Home, Self Care


Clinical Impression: 


 Influenza A





Condition: Good


Instructions:  ED Flu


Follow-Up: 


DEVIKA DRAKE DO [Primary Care Provider] - As Needed


Prescriptions: 


Albuterol Sulf [Ventolin Hfa Inhaler] 1 - 2 puffs INH Q4HR PRN #1 each


 PRN Reason: Shortness Of Air/Wheezing


Benzonatate [Tessalon] 200 mg PO TID PRN #30 cap


 PRN Reason: Cough


Comments: 


Please follow-up with your doctor for further care.  Return if you worsen.  Your

 prescriptions were sent to [facility] in [place].  You have tested positive for

 influenza A today.


Discharge Date/Time: 11/25/22 16:21

## 2022-11-25 NOTE — XRAY REPORT
PROCEDURE:  Chest 2 View X-Ray

 

INDICATIONS:  cough

 

TECHNIQUE:  2 views of the chest were acquired.  

 

COMPARISON:  Lung bases on CT abdomen and 8/28/2017.

 

FINDINGS:  

 

Surgical changes and devices:  None.  

 

Lungs and pleura:  No pleural effusions or pneumothorax.  Lungs appear clear. No consolidation identi
fied. 

 

Mediastinum:  Mediastinal contours are unchanged. Asymmetric elevation of the right hemidiaphragm..  
Heart size is normal.  

 

Bones and chest wall:  No suspicious bony abnormalities.  Soft tissues appear unremarkable.  

 

IMPRESSION:  

No consolidation identified.

 

Reviewed by: Johnny Rodriguez MD on 11/25/2022 3:10 PM PST

Approved by: Johnny Rodriguez MD on 11/25/2022 3:10 PM PST

 

 

Station ID:  [id number]